# Patient Record
Sex: FEMALE | Race: WHITE | NOT HISPANIC OR LATINO | Employment: FULL TIME | URBAN - METROPOLITAN AREA
[De-identification: names, ages, dates, MRNs, and addresses within clinical notes are randomized per-mention and may not be internally consistent; named-entity substitution may affect disease eponyms.]

---

## 2024-11-06 ENCOUNTER — APPOINTMENT (OUTPATIENT)
Dept: LAB | Facility: MEDICAL CENTER | Age: 29
End: 2024-11-06

## 2024-11-06 ENCOUNTER — OCCMED (OUTPATIENT)
Dept: URGENT CARE | Facility: MEDICAL CENTER | Age: 29
End: 2024-11-06

## 2024-11-06 DIAGNOSIS — Z02.1 PHYSICAL EXAM, PRE-EMPLOYMENT: ICD-10-CM

## 2024-11-06 DIAGNOSIS — Z00.00 ANNUAL PHYSICAL EXAM: Primary | ICD-10-CM

## 2024-11-06 LAB — RUBV IGG SERPL IA-ACNC: 47.5 IU/ML

## 2024-11-06 PROCEDURE — 86787 VARICELLA-ZOSTER ANTIBODY: CPT

## 2024-11-06 PROCEDURE — 86765 RUBEOLA ANTIBODY: CPT

## 2024-11-06 PROCEDURE — 86735 MUMPS ANTIBODY: CPT

## 2024-11-06 PROCEDURE — 36415 COLL VENOUS BLD VENIPUNCTURE: CPT

## 2024-11-06 PROCEDURE — 86762 RUBELLA ANTIBODY: CPT

## 2024-11-07 LAB
MEV IGG SER QL IA: NORMAL
MUV IGG SER QL IA: ABNORMAL
VZV IGG SER QL IA: ABNORMAL

## 2024-12-27 ENCOUNTER — TELEPHONE (OUTPATIENT)
Age: 29
End: 2024-12-27

## 2025-01-07 ENCOUNTER — TELEPHONE (OUTPATIENT)
Dept: PSYCHIATRY | Facility: CLINIC | Age: 30
End: 2025-01-07

## 2025-01-07 ENCOUNTER — TELEPHONE (OUTPATIENT)
Age: 30
End: 2025-01-07

## 2025-01-07 NOTE — TELEPHONE ENCOUNTER
Writer rec a call from pt regarding and call was transferred over to Intake for further assistance. All information was verified and insurance added

## 2025-01-07 NOTE — TELEPHONE ENCOUNTER
"Behavioral Health Outpatient Intake Questions    Referred By   :     Please advise interviewee that they need to answer all questions truthfully to allow for best care, and any misrepresentations of information may affect their ability to be seen at this clinic   => Was this discussed? Yes     If Minor Child (under age 18)    Who is/are the legal guardian(s) of the child?     Is there a custody agreement? No     If \"YES\"- Custody orders must be obtained prior to scheduling the first appointment  In addition, Consent to Treatment must be signed by all legal guardians prior to scheduling the first appointment    If \"NO\"- Consent to Treatment must be signed by all legal guardians prior to scheduling the first appointment    Behavioral Health Outpatient Intake History -     Presenting Problem (in patient's own words): Anxiety,     Are there any communication barriers for this patient?     No                                               If yes, please describe barriers:  NA  If there is a unique situation, please refer to Milton Walden/Lana Marcos for final determination.    Are you taking any psychiatric medications? No     If \"YES\" -What are they NA    If \"YES\" -Who prescribes?     Has the Patient previously received outpatient Talk Therapy or Medication Management from [facility]  No        If \"YES\"- When, Where and with Whom? NA        If \"NO\" -Has Patient received these services elsewhere?       If \"YES\" -When, Where, and with Whom?NA    Has the Patient abused alcohol or other substances in the last 6 months ? No       If \"YES\" -What substance, How much, How often?NA     If illegal substance: Refer to [facility] (for [facility]) or [facility].   If Alcohol in excess of 10 drinks per week:  Refer to [facility] (for [facility]) or [facility]    Legal History-     Is this treatment court ordered? No   If \"yes \"send to :  Talk Therapy : Send to Milton Walden for final determination   Med Management: Send to Dr." "Florence for final determination     Has the Patient been convicted of a felony?  No   If \"Yes\" send to -When, What?NA  Talk Therapy: Send to Milton Walden for final determination   Med Management: Send to Dr. Henriquez for final determination     ACCEPTED as a patient Yes  If \"Yes\" Appointment Date: 1/16/2025 11AM    Referred Elsewhere? No  If “Yes” - (Where? Ex: [facility], [facility], [facility], [facility], etc.) NA    Name of Insurance Co: [employer]  Insurance ID# [id number]  Insurance Phone # NA  If ins is primary or secondary?na  If patient is a minor, parents information such as Name, D.O.B of guarantor.  "

## 2025-01-16 ENCOUNTER — TELEMEDICINE (OUTPATIENT)
Dept: PSYCHIATRY | Facility: CLINIC | Age: 30
End: 2025-01-16
Payer: COMMERCIAL

## 2025-01-16 DIAGNOSIS — F41.8 OTHER SPECIFIED ANXIETY DISORDERS: Primary | ICD-10-CM

## 2025-01-16 PROCEDURE — 90791 PSYCH DIAGNOSTIC EVALUATION: CPT | Performed by: SOCIAL WORKER
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
## 2025-01-27 ENCOUNTER — TELEPHONE (OUTPATIENT)
Dept: FAMILY MEDICINE CLINIC | Facility: CLINIC | Age: 30
End: 2025-01-27

## 2025-02-10 ENCOUNTER — TELEMEDICINE (OUTPATIENT)
Dept: PSYCHIATRY | Facility: CLINIC | Age: 30
End: 2025-02-10
Payer: COMMERCIAL

## 2025-02-10 DIAGNOSIS — F41.1 GENERALIZED ANXIETY DISORDER: Primary | ICD-10-CM

## 2025-02-10 PROCEDURE — 90834 PSYTX W PT 45 MINUTES: CPT | Performed by: SOCIAL WORKER

## 2025-02-10 NOTE — PSYCH
"Behavioral Health Psychotherapy Progress Note    Psychotherapy Provided: Individual Psychotherapy     1. Generalized anxiety disorder            Goals addressed in session: Goal 1 and Goal 2     DATA: MSW met with Alen for session. Discussed her anxiety now and history of anxiety.  Her anxiety is interfering with her work and home.  Alen was at work for this session.  One issue that she has not dealt with is a miscarriage she had 2 years ago.  She instantly tears up when thinking/talking about it.  Developed tx plan.  Ran out of time to develop safety plan.  Will develop it next session.      During this session, this clinician used the following therapeutic modalities: Bereavement Therapy, Client-centered Therapy, Cognitive Behavioral Therapy, Solution-Focused Therapy, and Supportive Psychotherapy    Substance Abuse was not addressed during this session. If the client is diagnosed with a co-occurring substance use disorder, please indicate any changes in the frequency or amount of use: na. Stage of change for addressing substance use diagnoses: No substance use/Not applicable    ASSESSMENT:  Alen Potter presents with a Euthymic/ normal mood.     her affect is Normal range and intensity, which is congruent, with her mood and the content of the session. The client has made progress on their goals.     Alen Potter presents with a none risk of suicide, none risk of self-harm, and none risk of harm to others.    For any risk assessment that surpasses a \"low\" rating, a safety plan must be developed.    A safety plan was indicated: no  If yes, describe in detail na    PLAN: Between sessions, Alen Potter will address tx plan goals . At the next session, the therapist will use Bereavement Therapy, Cognitive Behavioral Therapy, Mindfulness-based Strategies, Solution-Focused Therapy, and Supportive Psychotherapy to address tx plan goals and any issues that arise in between sessions.    Behavioral Health Treatment " Plan and Discharge Planning: Alen Potter is aware of and agrees to continue to work on their treatment plan. They have identified and are working toward their discharge goals. yes    Depression Follow-up Plan Completed: Not applicable    Visit start and stop times:    02/10/25  Start Time: 1211  Stop Time: 1300  Total Visit Time: 49 minutes        Virtual Regular Visit    Verification of patient location:    Patient is located at Home /other in the following state in which I hold an active license [place]      Assessment/Plan:    Problem List Items Addressed This Visit       Generalized anxiety disorder - Primary       Goals addressed in session: Goal 1 and Goal 2     Depression Follow-up Plan Completed: Not applicable    Reason for visit is   Chief Complaint   Patient presents with    Virtual Regular Visit          Encounter provider Rashad Brantley      Recent Visits  No visits were found meeting these conditions.  Showing recent visits within past 7 days and meeting all other requirements  Today's Visits  Date Type Provider Dept   02/10/25 Telemedicine Rashad Brantley [facility]   Showing today's visits and meeting all other requirements  Future Appointments  No visits were found meeting these conditions.  Showing future appointments within next 150 days and meeting all other requirements       The patient was identified by name and date of birth. Alen Potter was informed that this is a telemedicine visit and that the visit is being conducted throughthe Sientra platform. She agrees to proceed..  My office door was closed. No one else was in the room.  She acknowledged consent and understanding of privacy and security of the video platform. The patient has agreed to participate and understands they can discontinue the visit at any time.    Patient is aware this is a billable service.     Subjective  Alen Potter is a 29 y.o. female  .      HPI     No past medical history on file.    No past  surgical history on file.    No current outpatient medications on file.     No current facility-administered medications for this visit.        Not on File    Review of Systems    Video Exam    There were no vitals filed for this visit.    Physical Exam

## 2025-02-10 NOTE — BH TREATMENT PLAN
"Outpatient Behavioral Health Psychotherapy Treatment Plan    Alen Potter  1995     Date of Initial Psychotherapy Assessment: 1/16/25   Date of Current Treatment Plan: 02/10/25  Treatment Plan Target Date: 8/25  Treatment Plan Expiration Date: 8/10/25    Diagnosis:   1. Generalized anxiety disorder            Area(s) of Need: I am having anxiety which is interfering with my work.    Long Term Goal 1 (in the client's own words): I want to decrease my anxiety.    Stage of Change: Preparation    Target Date for completion: 8/25     Anticipated therapeutic modalities: CBT     People identified to complete this goal: Alen      Objective 1:  I will look into the different vitamins for anxiety.     I will drink camomile tea.     I will do deep breathing exercises before I go into work and when I am at work.     I will tell myself, \"Jesus's dog.\"     I will use grounding exercises discussed in session.          Long Term Goal 2 (in the client's own words): I will process the miscarriage I had.    Stage of Change: Preparation    Target Date for completion: 8/25     Anticipated therapeutic modalities: CBT     People identified to complete this goal: Alen      Objective 1: I will process the miscarriage in therapy.     I will apply the coping skills above for anxiety.     I will utilize my support system.         Long Term Goal 3 (in the client's own words): na     Stage of Change: Preparation    Target Date for completion: na      Anticipated therapeutic modalities: na     People identified to complete this goal: na      Objective 1: (identify the means of measuring success in meeting the objective): na      Objective 2: (identify the means of measuring success in meeting the objective): na     I am currently under the care of a [facility] psychiatric provider: no    My . [facility] psychiatric provider is: na    I am currently taking psychiatric medications:  n0    I feel that I will be ready for discharge from " mental health care when I reach the following (measurable goal/objective): When I meet all my tx plan goals and have no further goals to address.    For children and adults who have a legal guardian:   Has there been any change to custody orders and/or guardianship status? NA. If yes, attach updated documentation.    I have created my Crisis Plan and have been offered a copy of this plan    Behavioral Health Treatment Plan [facility]: Diagnosis and Treatment Plan explained to Alen Potter acknowledges an understanding of their diagnosis. Alen Potter agrees to this treatment plan.    I have been offered a copy of this Treatment Plan. yes

## 2025-02-28 ENCOUNTER — TELEPHONE (OUTPATIENT)
Dept: BEHAVIORAL/MENTAL HEALTH CLINIC | Facility: CLINIC | Age: 30
End: 2025-02-28

## 2025-03-05 ENCOUNTER — TELEPHONE (OUTPATIENT)
Dept: PSYCHIATRY | Facility: CLINIC | Age: 30
End: 2025-03-05

## 2025-03-05 NOTE — TELEPHONE ENCOUNTER
Writer spoke with patient and her recent cancellation  of upcoming appts.  She no longer works for [employer] and does not have insurance.      She stated she would not be able to continue service.  Patient does have main B/H phone number to call when and if she needs services again.     Patient is also aware of her discharge at this time.

## 2025-03-12 ENCOUNTER — DOCUMENTATION (OUTPATIENT)
Dept: BEHAVIORAL/MENTAL HEALTH CLINIC | Facility: CLINIC | Age: 30
End: 2025-03-12

## 2025-03-12 NOTE — PROGRESS NOTES
"Psychotherapy Discharge Summary    Preferred Name: Alen Potter  YOB: 1995    Admission date to psychotherapy: 1/16/25    Referred by: self     Presenting Problem: Used to do therapy for anxiety stopped a year ago was doing good, started a new job, had a blow up at work and this \"sent her over the top\", last week called off of work due to having an anxiety attack, couldn't go back to previous therapist due to ins reasons.     Course of treatment included : individual therapy     Progress/Outcome of Treatment Goals (brief summary of course of treatment) Alen was only seen for two sessions.  Minimal progress.    Treatment Complications (if any): She called on 3/5/25 stating, \"She no longer works for [employer] and does not have insurance.\"    Treatment Progress: fair    Current [facility] Psychiatric Provider: none    Discharge Medications include: none    Discharge Date: 3/5/25    Discharge Diagnosis: No diagnosis found.    Criteria for Discharge: change in Insurance    Aftercare recommendations include (include specific referral names and phone numbers, if appropriate): Follow up if /when she obtains ins and if is in need of tx.    Prognosis: fair    "

## 2025-03-21 ENCOUNTER — OFFICE VISIT (OUTPATIENT)
Dept: FAMILY MEDICINE CLINIC | Facility: CLINIC | Age: 30
End: 2025-03-21

## 2025-03-21 VITALS
SYSTOLIC BLOOD PRESSURE: 122 MMHG | HEART RATE: 88 BPM | RESPIRATION RATE: 16 BRPM | TEMPERATURE: 97.2 F | WEIGHT: 154 LBS | OXYGEN SATURATION: 95 % | BODY MASS INDEX: 27.29 KG/M2 | HEIGHT: 63 IN | DIASTOLIC BLOOD PRESSURE: 80 MMHG

## 2025-03-21 DIAGNOSIS — D68.51 HETEROZYGOUS FACTOR V LEIDEN MUTATION (HCC): ICD-10-CM

## 2025-03-21 DIAGNOSIS — F41.1 GENERALIZED ANXIETY DISORDER: ICD-10-CM

## 2025-03-21 DIAGNOSIS — R73.01 ELEVATED FASTING GLUCOSE: ICD-10-CM

## 2025-03-21 DIAGNOSIS — Z76.89 ENCOUNTER TO ESTABLISH CARE: Primary | ICD-10-CM

## 2025-03-21 PROBLEM — Z98.890 HISTORY OF LOOP ELECTRICAL EXCISION PROCEDURE (LEEP): Status: ACTIVE | Noted: 2024-07-19

## 2025-03-21 PROBLEM — Z98.51 S/P TUBAL LIGATION: Status: ACTIVE | Noted: 2024-07-19

## 2025-03-21 PROCEDURE — 99204 OFFICE O/P NEW MOD 45 MIN: CPT | Performed by: NURSE PRACTITIONER

## 2025-03-21 RX ORDER — FEXOFENADINE HCL 60 MG/1
60 TABLET, FILM COATED ORAL
COMMUNITY

## 2025-03-21 RX ORDER — THEANINE 100 MG
CAPSULE ORAL
COMMUNITY
Start: 2025-03-10

## 2025-03-21 RX ORDER — HYDROXYZINE HYDROCHLORIDE 25 MG/1
25 TABLET, FILM COATED ORAL EVERY 6 HOURS PRN
COMMUNITY
Start: 2025-03-06

## 2025-03-21 NOTE — ASSESSMENT & PLAN NOTE
Update labs. Can continue with supplementation.   Continue with therapy.   Can use atarax as needed.     Orders:  •  Vitamin B12; Future  •  Vitamin D 25 hydroxy; Future  •  Lipid panel; Future

## 2025-03-21 NOTE — ASSESSMENT & PLAN NOTE
Family history- carrier but not expressed. No history of blood clots.   States she has tested negative for this in the past.

## 2025-03-21 NOTE — PROGRESS NOTES
Name: Alen Potter      : 1995      MRN: [id number]  Encounter Provider: ROJAS Ashraf  Encounter Date: 3/21/2025   Encounter department: [facility]  :  Assessment & Plan  Encounter to establish care  Reviewed history.   Follow up in few months for well visit.        Heterozygous factor V Leiden mutation (HCC)  Family history- carrier but not expressed. No history of blood clots.   States she has tested negative for this in the past.          Generalized anxiety disorder  Update labs. Can continue with supplementation.   Continue with therapy.   Can use atarax as needed.     Orders:  •  Vitamin B12; Future  •  Vitamin D 25 hydroxy; Future  •  Lipid panel; Future    BMI 27.0-27.9,adult  Update labs   Continue with healthy lifestyle changes   Orders:  •  Vitamin B12; Future  •  Vitamin D 25 hydroxy; Future  •  Lipid panel; Future  •  Hemoglobin A1C; Future  •  TSH, 3rd generation with Free T4 reflex; Future    Elevated fasting glucose  Will check A1c. Family history of diabetes   Orders:  •  Hemoglobin A1C; Future          Depression Screening and Follow-up Plan: Patient was screened for depression during today's encounter. They screened negative with a PHQ-2 score of 0.        History of Present Illness   Patient presents the office today to establish care as new patient.  Patient was previously following with [facility].  Patient is established with psychologist.  She currently is doing therapy.  She was seen in the emergency room on  for sternal burning and pain and worsening anxiety.  Her therapist told her to take a B complex.  Labs were done in the emergency room.  Elevated white count at 14.6 metabolic panel showed elevated glucose at 129 but she was not fasting at the time.  Troponin was negative.  D-dimer slightly elevated.  CTA of chest was done which was negative for PE.    Anxiety since about . Was in ER for anxiety attack. Switched job  "which helped anxiety/stress. Did therapy during that time which helped. Anxiety was only present when it was medical related (health anxiety). She worked on coping mechanisms through therapy which helped. She felt symptoms were stable for a while.     Started working with [employer] in November- not a great working environment so she left.     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       Review of Systems   Constitutional:  Negative for chills and fever.   Eyes:  Negative for discharge.   Respiratory:  Negative for shortness of breath.    Cardiovascular:  Negative for chest pain.   Gastrointestinal:  Negative for constipation and diarrhea.   Genitourinary:  Negative for difficulty urinating.   Musculoskeletal:  Negative for joint swelling.   Skin:  Negative for rash.   Neurological:  Negative for headaches.   Hematological:  Negative for adenopathy.   Psychiatric/Behavioral:  The patient is nervous/anxious.        Objective   /80   Pulse 88   Temp (!) 97.2 °F (36.2 °C) (Temporal)   Resp 16   Ht 5' 3\" (1.6 m)   Wt 69.9 kg (154 lb)   SpO2 95%   BMI 27.28 kg/m²      Physical Exam  Vitals and nursing note reviewed.   Constitutional:       General: She is not in acute distress.     Appearance: Normal appearance. She is well-developed. She is not diaphoretic.   HENT:      Head: Normocephalic and atraumatic.      Right Ear: External ear normal.      Left Ear: External ear normal.   Eyes:      General: Lids are normal.         Right eye: No discharge.         Left eye: No discharge.      Conjunctiva/sclera: Conjunctivae normal.   Cardiovascular:      Rate and Rhythm: Normal rate and regular rhythm.      Heart sounds: No murmur heard.  Pulmonary:      Effort: Pulmonary effort is normal. No respiratory distress.      Breath sounds: Normal breath sounds. No wheezing.   Musculoskeletal:         General: No deformity.   Skin:     General: Skin is warm and dry.   Neurological: "      General: No focal deficit present.      Mental Status: She is alert and oriented to person, place, and time.   Psychiatric:         Speech: Speech normal.         Behavior: Behavior normal.         Thought Content: Thought content normal.         Judgment: Judgment normal.

## 2025-03-21 NOTE — PATIENT INSTRUCTIONS
Complete labs- these are fasting.     Continue with therapy.     Can use atarax as needed.     Low dose magnesium glycinate may help with sleep and anxiety.       Please call the office if you are experiencing any worsening of symptoms or no symptom improvement.       How to find a therapist:  You can call the back of your insurance card for covered providers or check on your insurance's website. Or you can visit [facility] [employer] to find a covered therapist. You can filter by your insurance type, location, and other preferences. This will give you a list with pictures and bios about the therapists so you can appropriately choose one you feel will be the most helpful for your goals.         I personally recommend Maya Raymond as an excellent therapist.     Maya Raymond [facility]   For now she offers virtual appointments. Appointments can be made through the link above.     Additional information can be found at:   Maya Raymond, Licensed Professional Counselor, [place], [id number]  Psychology Today     You can e-mail or call her with any questions. She does offer sliding scale options for payment if your insurance is not listed.

## 2025-03-22 ENCOUNTER — APPOINTMENT (OUTPATIENT)
Dept: LAB | Age: 30
End: 2025-03-22

## 2025-03-22 DIAGNOSIS — F41.1 GENERALIZED ANXIETY DISORDER: ICD-10-CM

## 2025-03-22 DIAGNOSIS — R73.01 ELEVATED FASTING GLUCOSE: ICD-10-CM

## 2025-03-22 LAB
25(OH)D3 SERPL-MCNC: 30.3 NG/ML (ref 30–100)
CHOLEST SERPL-MCNC: 167 MG/DL (ref ?–200)
EST. AVERAGE GLUCOSE BLD GHB EST-MCNC: 123 MG/DL
HBA1C MFR BLD: 5.9 %
HDLC SERPL-MCNC: 36 MG/DL
LDLC SERPL CALC-MCNC: 117 MG/DL (ref 0–100)
MAGNESIUM SERPL-MCNC: 1.9 MG/DL (ref 1.9–2.7)
NONHDLC SERPL-MCNC: 131 MG/DL
TRIGL SERPL-MCNC: 69 MG/DL (ref ?–150)
TSH SERPL DL<=0.05 MIU/L-ACNC: 1.59 UIU/ML (ref 0.45–4.5)
VIT B12 SERPL-MCNC: 362 PG/ML (ref 180–914)

## 2025-03-22 PROCEDURE — 36415 COLL VENOUS BLD VENIPUNCTURE: CPT

## 2025-03-22 PROCEDURE — 84443 ASSAY THYROID STIM HORMONE: CPT

## 2025-03-22 PROCEDURE — 82607 VITAMIN B-12: CPT

## 2025-03-22 PROCEDURE — 83735 ASSAY OF MAGNESIUM: CPT

## 2025-03-22 PROCEDURE — 82306 VITAMIN D 25 HYDROXY: CPT

## 2025-03-22 PROCEDURE — 80061 LIPID PANEL: CPT

## 2025-03-22 PROCEDURE — 83036 HEMOGLOBIN GLYCOSYLATED A1C: CPT

## 2025-03-24 ENCOUNTER — TELEPHONE (OUTPATIENT)
Age: 30
End: 2025-03-24

## 2025-03-24 ENCOUNTER — RESULTS FOLLOW-UP (OUTPATIENT)
Dept: FAMILY MEDICINE CLINIC | Facility: CLINIC | Age: 30
End: 2025-03-24

## 2025-03-24 NOTE — TELEPHONE ENCOUNTER
A1c in prediabetic range at 5.9.  Recommend rechecking in 6 months.  B12 slightly borderline.  Could supplement 500 mcg daily  Patient has wellness visit scheduled for the end of next month or we can review further

## 2025-04-29 ENCOUNTER — TELEPHONE (OUTPATIENT)
Dept: FAMILY MEDICINE CLINIC | Facility: CLINIC | Age: 30
End: 2025-04-29

## 2025-05-08 ENCOUNTER — OFFICE VISIT (OUTPATIENT)
Dept: FAMILY MEDICINE CLINIC | Facility: CLINIC | Age: 30
End: 2025-05-08
Payer: COMMERCIAL

## 2025-05-08 VITALS
SYSTOLIC BLOOD PRESSURE: 120 MMHG | HEART RATE: 95 BPM | BODY MASS INDEX: 27.29 KG/M2 | DIASTOLIC BLOOD PRESSURE: 78 MMHG | OXYGEN SATURATION: 99 % | WEIGHT: 154 LBS | HEIGHT: 63 IN | TEMPERATURE: 97.6 F | RESPIRATION RATE: 16 BRPM

## 2025-05-08 DIAGNOSIS — E53.8 B12 DEFICIENCY: ICD-10-CM

## 2025-05-08 DIAGNOSIS — R73.03 PREDIABETES: ICD-10-CM

## 2025-05-08 DIAGNOSIS — Z00.00 HEALTH MAINTENANCE EXAMINATION: Primary | ICD-10-CM

## 2025-05-08 DIAGNOSIS — Z12.4 CERVICAL CANCER SCREENING: ICD-10-CM

## 2025-05-08 DIAGNOSIS — D68.51 HETEROZYGOUS FACTOR V LEIDEN MUTATION (HCC): ICD-10-CM

## 2025-05-08 DIAGNOSIS — F41.1 GENERALIZED ANXIETY DISORDER: ICD-10-CM

## 2025-05-08 PROCEDURE — 99395 PREV VISIT EST AGE 18-39: CPT | Performed by: NURSE PRACTITIONER

## 2025-05-08 NOTE — PATIENT INSTRUCTIONS
Vitamin B12 was low. Recommend starting oral B12 500- 1,000 mcg daily. Sublingual formulations may provide better absorption.     Recheck in about 5 months.     Work on low carb/ low sugar diet. Continue with exercise.     Please call the office if you are experiencing any worsening of symptoms or no symptom improvement.

## 2025-05-08 NOTE — PROGRESS NOTES
Adult Annual Physical  Name: Alen Potter      : 1995      MRN: [id number]  Encounter Provider: ROJAS Ashraf  Encounter Date: 2025   Encounter department: [facility]    :  Assessment & Plan  Health maintenance examination  Tdap and Gardisil up to date per patient - will bring in Tdap record         Cervical cancer screening  Referral to GYN  Orders:  •  Ambulatory Referral to Obstetrics / Gynecology; Future    BMI 26.0-26.9,adult  Continue with healthy lifestyle changes.        B12 deficiency  Vitamin B12 was low. Recommend starting oral B12 500- 1,000 mcg daily. Sublingual formulations may provide better absorption.        Prediabetes  Lab Results   Component Value Date    HGBA1C 5.9 (H) 2025   Recheck in 6 months.        Generalized anxiety disorder  Starting therapy  Stable with supplementation  Has atarax as needed  Symptoms improved/ stable       Heterozygous factor V Leiden mutation (HCC)  Family history- carrier but not expressed. No history of blood clots.   States she has tested negative for this in the past.              Preventive Screenings:  - Diabetes Screening: screening up-to-date  - Cholesterol Screening: screening up-to-date   - Cervical cancer screening: risks/benefits discussed and orders placed   - Colon cancer screening: screening not indicated   - Lung cancer screening: screening not indicated     Counseling/Anticipatory Guidance:    - Diet: discussed recommendations for a healthy/well-balanced diet.   - Exercise: the importance of regular exercise/physical activity was discussed. Recommend exercise 3-5 times per week for at least 30 minutes.       Depression Screening and Follow-up Plan: Patient was screened for depression during today's encounter. They screened negative with a PHQ-2 score of 0.          History of Present Illness     Adult Annual Physical:  Patient presents for annual physical. Here for well visit. Labs due to be  "reviewed.     Reviewed labs in telephone encounter:   A1c in prediabetic range at 5.9.  Recommend rechecking in 6 months.  B12 slightly borderline.  Could supplement 500 mcg daily  Patient has wellness visit scheduled for the end of next month or we can review further      Since then she has made major lifestyle changes.   .     Diet and Physical Activity:  - Diet/Nutrition: portion control, heart healthy (low sodium) diet, limited junk food and low carb diet.  - Exercise: vigorous cardiovascular exercise and 3-4 times a week on average.    Depression Screening:  - PHQ-2 Score: 0    General Health:  - Sleep: 4-6 hours of sleep on average.  - Hearing: normal hearing right ear and normal hearing left ear.  - Vision: wears glasses.  - Dental: regular dental visits.    /GYN Health:  - Follows with GYN: no.   - Last menstrual cycle: 5/6/2025.   - History of STDs: no  - Contraception: tubal ligation.      Advanced Care Planning:  - Has an advanced directive?: no    - Has a durable medical POA?: no      Review of Systems   Constitutional:  Negative for chills and fever.   Eyes:  Negative for discharge.   Respiratory:  Negative for shortness of breath.    Cardiovascular:  Negative for chest pain.   Gastrointestinal:  Negative for constipation and diarrhea.   Genitourinary:  Negative for difficulty urinating.   Musculoskeletal:  Negative for joint swelling.   Skin:  Negative for rash.   Neurological:  Negative for headaches.   Hematological:  Negative for adenopathy.   Psychiatric/Behavioral:  The patient is not nervous/anxious.          Objective   /78   Pulse 95   Temp 97.6 °F (36.4 °C) (Temporal)   Resp 16   Ht 5' 3.39\" (1.61 m)   Wt 69.9 kg (154 lb)   LMP 05/06/2025   SpO2 99%   BMI 26.95 kg/m²     Physical Exam  Vitals and nursing note reviewed.   Constitutional:       General: She is not in acute distress.     Appearance: Normal appearance. She is not ill-appearing, toxic-appearing or diaphoretic. "   HENT:      Head: Normocephalic and atraumatic.      Right Ear: External ear normal.      Left Ear: External ear normal.      Nose: Nose normal.      Mouth/Throat:      Mouth: Mucous membranes are moist.      Pharynx: Oropharynx is clear. No oropharyngeal exudate or posterior oropharyngeal erythema.   Eyes:      General: Lids are normal. No scleral icterus.        Right eye: No discharge.         Left eye: No discharge.      Extraocular Movements: Extraocular movements intact.      Conjunctiva/sclera: Conjunctivae normal.      Pupils: Pupils are equal, round, and reactive to light.   Cardiovascular:      Rate and Rhythm: Normal rate and regular rhythm. No extrasystoles are present.     Heart sounds: S1 normal and S2 normal. No murmur heard.  Pulmonary:      Effort: Pulmonary effort is normal. No respiratory distress.      Breath sounds: Normal breath sounds. No stridor or decreased air movement. No wheezing or rhonchi.   Abdominal:      General: Bowel sounds are normal.      Palpations: Abdomen is soft.   Musculoskeletal:         General: Normal range of motion.      Cervical back: Normal range of motion and neck supple.   Skin:     General: Skin is warm and dry.   Neurological:      General: No focal deficit present.      Mental Status: She is alert and oriented to person, place, and time. Mental status is at baseline.      Cranial Nerves: No cranial nerve deficit.      Sensory: No sensory deficit.      Motor: No weakness.      Coordination: Coordination normal.      Gait: Gait normal.   Psychiatric:         Attention and Perception: Attention and perception normal.         Mood and Affect: Mood and affect normal.         Speech: Speech normal.         Behavior: Behavior is cooperative.         Cognition and Memory: Cognition normal.         Judgment: Judgment normal.

## 2025-08-14 ENCOUNTER — PATIENT MESSAGE (OUTPATIENT)
Dept: FAMILY MEDICINE CLINIC | Facility: CLINIC | Age: 30
End: 2025-08-14